# Patient Record
Sex: MALE | URBAN - METROPOLITAN AREA
[De-identification: names, ages, dates, MRNs, and addresses within clinical notes are randomized per-mention and may not be internally consistent; named-entity substitution may affect disease eponyms.]

---

## 2018-07-12 ENCOUNTER — OFFICE VISIT (OUTPATIENT)
Dept: URBAN - METROPOLITAN AREA CLINIC 58 | Facility: CLINIC | Age: 73
End: 2018-07-12
Payer: MEDICARE

## 2018-07-12 PROCEDURE — 92012 INTRM OPH EXAM EST PATIENT: CPT | Performed by: OPTOMETRIST

## 2018-07-12 ASSESSMENT — INTRAOCULAR PRESSURE
OD: 12
OS: 12

## 2018-07-12 NOTE — IMPRESSION/PLAN
Impression: Low-tension glaucoma, bilateral, moderate stage: F82.8042. Plan: Discussed diagnosis in detail with pt. Will continue to observe patient's IOP and vision.   Continue lumigan 1gtt OU qhs

## 2018-12-07 ENCOUNTER — OFFICE VISIT (OUTPATIENT)
Dept: URBAN - METROPOLITAN AREA CLINIC 58 | Facility: CLINIC | Age: 73
End: 2018-12-07
Payer: MEDICARE

## 2018-12-07 DIAGNOSIS — H40.1231 LOW-TENSION GLAUCOMA, BILATERAL, MILD STAGE: ICD-10-CM

## 2018-12-07 PROCEDURE — 92014 COMPRE OPH EXAM EST PT 1/>: CPT | Performed by: OPTOMETRIST

## 2018-12-07 PROCEDURE — 92133 CPTRZD OPH DX IMG PST SGM ON: CPT | Performed by: OPTOMETRIST

## 2018-12-07 ASSESSMENT — INTRAOCULAR PRESSURE
OS: 15
OD: 15

## 2018-12-07 NOTE — IMPRESSION/PLAN
Impression: Puckering of macula, right eye: H35.371. Plan: Discussed diagnosis in detail with patient. No treatment is required at this time. Will continue to observe condition and or symptoms. Call if [address] worsens.

## 2018-12-07 NOTE — IMPRESSION/PLAN
Impression: Low-tension glaucoma, bilateral, moderate stage: X07.9767. Plan: Discussed diagnosis in detail with pt. Will continue to observe patient's IOP and vision.   Continue lumigan 1gtt OU qhs

## 2019-06-12 ENCOUNTER — OFFICE VISIT (OUTPATIENT)
Dept: URBAN - METROPOLITAN AREA CLINIC 58 | Facility: CLINIC | Age: 74
End: 2019-06-12
Payer: MEDICARE

## 2019-06-12 PROCEDURE — 92014 COMPRE OPH EXAM EST PT 1/>: CPT | Performed by: OPTOMETRIST

## 2019-06-12 RX ORDER — BIMATOPROST 0.1 MG/ML
0.01 % SOLUTION/ DROPS OPHTHALMIC
Qty: 3 | Refills: 3 | Status: INACTIVE
Start: 2019-06-12 | End: 2020-06-12

## 2019-06-12 ASSESSMENT — INTRAOCULAR PRESSURE
OD: 15
OS: 15

## 2019-06-12 ASSESSMENT — KERATOMETRY
OS: 42.88
OD: 42.88

## 2019-06-12 NOTE — IMPRESSION/PLAN
Impression: Low-tension glaucoma, bilateral, moderate stage: K14.0910. Plan: Discussed diagnosis in detail with pt. Will continue to observe patient's IOP and vision.   Continue lumigan 1gtt OU qhs, good control

## 2019-12-12 ENCOUNTER — OFFICE VISIT (OUTPATIENT)
Dept: URBAN - METROPOLITAN AREA CLINIC 58 | Facility: CLINIC | Age: 74
End: 2019-12-12
Payer: MEDICARE

## 2019-12-12 DIAGNOSIS — H40.1232 LOW-TENSION GLAUCOMA, BILATERAL, MODERATE STAGE: Primary | ICD-10-CM

## 2019-12-12 PROCEDURE — 92012 INTRM OPH EXAM EST PATIENT: CPT | Performed by: OPTOMETRIST

## 2019-12-12 PROCEDURE — 92083 EXTENDED VISUAL FIELD XM: CPT | Performed by: OPTOMETRIST

## 2019-12-12 ASSESSMENT — INTRAOCULAR PRESSURE
OS: 14
OD: 13

## 2019-12-12 ASSESSMENT — KERATOMETRY
OD: 43.25
OS: 43.25

## 2019-12-12 NOTE — IMPRESSION/PLAN
Impression: Low-tension glaucoma, bilateral, moderate stage: C79.5150. Plan: Discussed diagnosis in detail with pt. Will continue to observe patient's IOP and vision. Continue lumigan 1gtt OU qhs. IOP good. VF 24-2 done today.

## 2020-06-12 ENCOUNTER — OFFICE VISIT (OUTPATIENT)
Dept: URBAN - METROPOLITAN AREA CLINIC 58 | Facility: CLINIC | Age: 75
End: 2020-06-12
Payer: MEDICARE

## 2020-06-12 DIAGNOSIS — H04.123 DRY EYE SYNDROME OF BILATERAL LACRIMAL GLANDS: ICD-10-CM

## 2020-06-12 PROCEDURE — 92133 CPTRZD OPH DX IMG PST SGM ON: CPT | Performed by: OPTOMETRIST

## 2020-06-12 PROCEDURE — 92014 COMPRE OPH EXAM EST PT 1/>: CPT | Performed by: OPTOMETRIST

## 2020-06-12 RX ORDER — BIMATOPROST 0.1 MG/ML
0.01 % SOLUTION/ DROPS OPHTHALMIC
Qty: 3 | Refills: 3 | Status: INACTIVE
Start: 2020-06-12 | End: 2021-08-10

## 2020-06-12 ASSESSMENT — INTRAOCULAR PRESSURE
OD: 15
OS: 18
OS: 17

## 2020-06-12 NOTE — IMPRESSION/PLAN
Impression: Puckering of macula, right eye: H35.371. Plan: Discussed diagnosis in detail with patient. No treatment is required at this time. Will continue to observe condition and or symptoms. Call if [address] worsens.

## 2020-06-12 NOTE — IMPRESSION/PLAN
Impression: Low-tension glaucoma, bilateral, moderate stage: K20.4534. Plan: Discussed diagnosis in detail with pt. Will continue to observe patient's IOP and vision. Continue lumigan 1gtt OU qhs. IOP good. OCT ON done today shows, OS inferior and superior notch but stable and OD, inferior and superior thinning but stable.

## 2020-12-11 ENCOUNTER — OFFICE VISIT (OUTPATIENT)
Dept: URBAN - METROPOLITAN AREA CLINIC 58 | Facility: CLINIC | Age: 75
End: 2020-12-11
Payer: MEDICARE

## 2020-12-11 PROCEDURE — 92012 INTRM OPH EXAM EST PATIENT: CPT | Performed by: OPTOMETRIST

## 2020-12-11 PROCEDURE — 92083 EXTENDED VISUAL FIELD XM: CPT | Performed by: OPTOMETRIST

## 2020-12-11 ASSESSMENT — INTRAOCULAR PRESSURE
OD: 13
OS: 14
OD: 12
OS: 12

## 2020-12-11 NOTE — IMPRESSION/PLAN
Impression: Low-tension glaucoma, bilateral, moderate stage: B51.3016. Plan: Discussed diagnosis in detail with pt. Will continue to observe patient's IOP and vision. Continue lumigan 1gtt OU qhs. IOP good. VF done today shows scattered defects OD and moderate, severe SAS OS.

## 2021-07-21 ENCOUNTER — OFFICE VISIT (OUTPATIENT)
Dept: URBAN - METROPOLITAN AREA CLINIC 58 | Facility: CLINIC | Age: 76
End: 2021-07-21
Payer: MEDICARE

## 2021-07-21 DIAGNOSIS — H35.371 PUCKERING OF MACULA, RIGHT EYE: ICD-10-CM

## 2021-07-21 PROCEDURE — 76514 ECHO EXAM OF EYE THICKNESS: CPT | Performed by: OPTOMETRIST

## 2021-07-21 PROCEDURE — 92014 COMPRE OPH EXAM EST PT 1/>: CPT | Performed by: OPTOMETRIST

## 2021-07-21 PROCEDURE — 92133 CPTRZD OPH DX IMG PST SGM ON: CPT | Performed by: OPTOMETRIST

## 2021-07-21 ASSESSMENT — INTRAOCULAR PRESSURE
OS: 16
OD: 17

## 2021-07-21 NOTE — IMPRESSION/PLAN
Impression: Puckering of macula, right eye: H35.371. Plan: Discussed diagnosis in detail with patient. Advised patient of condition. Monitor. Order testing next visit.

## 2021-07-21 NOTE — IMPRESSION/PLAN
Impression: Low-tension glaucoma, bilateral, moderate stage: P06.4255. Plan: Discussed diagnosis in detail with pt. Will continue to observe patient's IOP and vision. Continue lumigan 1gtt OU qhs. IOP good. OCT ON performed today, shows stable IAS OS.

## 2022-01-21 ENCOUNTER — OFFICE VISIT (OUTPATIENT)
Dept: URBAN - METROPOLITAN AREA CLINIC 58 | Facility: CLINIC | Age: 77
End: 2022-01-21
Payer: MEDICARE

## 2022-01-21 DIAGNOSIS — H49.10 4TH NERVE PALSY OF EYE: ICD-10-CM

## 2022-01-21 PROCEDURE — 92083 EXTENDED VISUAL FIELD XM: CPT | Performed by: OPTOMETRIST

## 2022-01-21 PROCEDURE — 92134 CPTRZ OPH DX IMG PST SGM RTA: CPT | Performed by: OPTOMETRIST

## 2022-01-21 PROCEDURE — 99213 OFFICE O/P EST LOW 20 MIN: CPT | Performed by: OPTOMETRIST

## 2022-01-21 ASSESSMENT — INTRAOCULAR PRESSURE
OD: 18
OS: 16

## 2022-01-21 NOTE — IMPRESSION/PLAN
Impression: Low-tension glaucoma, bilateral, moderate stage: R41.4214. Plan: Discussed diagnosis in detail with pt. Will continue to observe patient's IOP and vision. Continue lumigan 1gtt OU qhs. VF 24-2 ordered and performed today.

## 2022-01-21 NOTE — IMPRESSION/PLAN
Impression: Puckering of macula, right eye: H35.371. Plan: Discussed diagnosis in detail with patient. Advised patient of condition. OCT MAC done today shows ERM OD and WNL OS. Monitor.

## 2022-01-21 NOTE — IMPRESSION/PLAN
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 Plan: Discussed diagnosis in detail with patient. 4th nerve palsy OD. Most likely microvascular in nature. Will go away on it's own. Will continue to monitor. Call if worsening.

## 2022-02-23 ENCOUNTER — OFFICE VISIT (OUTPATIENT)
Dept: URBAN - METROPOLITAN AREA CLINIC 58 | Facility: CLINIC | Age: 77
End: 2022-02-23
Payer: MEDICARE

## 2022-02-23 DIAGNOSIS — H26.492 OTHER SECONDARY CATARACT, LEFT EYE: ICD-10-CM

## 2022-02-23 PROCEDURE — 99213 OFFICE O/P EST LOW 20 MIN: CPT | Performed by: OPTOMETRIST

## 2022-02-23 ASSESSMENT — INTRAOCULAR PRESSURE
OD: 16
OS: 16

## 2022-02-23 NOTE — IMPRESSION/PLAN
Impression: Low-tension glaucoma, bilateral, moderate stage: Q54.9342. Plan: Discussed diagnosis in detail with pt. Will continue to observe patient's IOP and vision. Continue Lumigan 1gtt OU QHS.

## 2022-02-23 NOTE — IMPRESSION/PLAN
Impression: Other secondary cataract, left eye: H26.492. Plan: S/P YAG Capsulotomy. Opening is small and may be causing ghosting of imagines and glare. No treatment required. Call if [facility] worse.

## 2022-02-23 NOTE — IMPRESSION/PLAN
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 Plan: 4th nerve palsy OD, improving. Observation recommended. Call if symptoms worsening.

## 2024-01-17 ENCOUNTER — OFFICE VISIT (OUTPATIENT)
Dept: URBAN - METROPOLITAN AREA CLINIC 58 | Facility: CLINIC | Age: 79
End: 2024-01-17
Payer: MEDICARE

## 2024-01-17 DIAGNOSIS — H26.492 OTHER SECONDARY CATARACT, LEFT EYE: ICD-10-CM

## 2024-01-17 DIAGNOSIS — H35.371 PUCKERING OF MACULA, RIGHT EYE: Primary | ICD-10-CM

## 2024-01-17 DIAGNOSIS — H40.1232 LOW-TENSION GLAUCOMA, BILATERAL, MODERATE STAGE: ICD-10-CM

## 2024-01-17 DIAGNOSIS — H49.10 4TH NERVE PALSY OF EYE: ICD-10-CM

## 2024-01-17 PROCEDURE — 92014 COMPRE OPH EXAM EST PT 1/>: CPT | Performed by: OPTOMETRIST

## 2024-01-17 PROCEDURE — 92134 CPTRZ OPH DX IMG PST SGM RTA: CPT | Performed by: OPTOMETRIST

## 2024-01-17 RX ORDER — BIMATOPROST 0.1 MG/ML
0.01 % SOLUTION/ DROPS OPHTHALMIC
Qty: 7.5 | Refills: 3 | Status: ACTIVE
Start: 2024-01-17

## 2024-01-17 ASSESSMENT — KERATOMETRY
OD: 43.00
OS: 42.88

## 2024-01-17 ASSESSMENT — INTRAOCULAR PRESSURE
OS: 16
OD: 16

## 2024-09-11 ENCOUNTER — OFFICE VISIT (OUTPATIENT)
Dept: URBAN - METROPOLITAN AREA CLINIC 58 | Facility: CLINIC | Age: 79
End: 2024-09-11
Payer: MEDICARE

## 2024-09-11 DIAGNOSIS — H40.1232 LOW-TENSION GLAUCOMA, BILATERAL, MODERATE STAGE: Primary | ICD-10-CM

## 2024-09-11 DIAGNOSIS — H35.371 PUCKERING OF MACULA, RIGHT EYE: ICD-10-CM

## 2024-09-11 PROCEDURE — 92014 COMPRE OPH EXAM EST PT 1/>: CPT | Performed by: OPTOMETRIST

## 2024-09-11 PROCEDURE — 92133 CPTRZD OPH DX IMG PST SGM ON: CPT | Performed by: OPTOMETRIST

## 2024-09-11 ASSESSMENT — INTRAOCULAR PRESSURE
OD: 18
OS: 16

## 2025-04-10 ENCOUNTER — OFFICE VISIT (OUTPATIENT)
Dept: URBAN - METROPOLITAN AREA CLINIC 58 | Facility: CLINIC | Age: 80
End: 2025-04-10
Payer: COMMERCIAL

## 2025-04-10 DIAGNOSIS — H35.371 PUCKERING OF MACULA, RIGHT EYE: ICD-10-CM

## 2025-04-10 DIAGNOSIS — H40.1232 LOW-TENSION GLAUCOMA, BILATERAL, MODERATE STAGE: Primary | ICD-10-CM

## 2025-04-10 PROCEDURE — 99214 OFFICE O/P EST MOD 30 MIN: CPT | Performed by: OPTOMETRIST

## 2025-04-10 PROCEDURE — 92134 CPTRZ OPH DX IMG PST SGM RTA: CPT | Performed by: OPTOMETRIST

## 2025-04-10 PROCEDURE — 92083 EXTENDED VISUAL FIELD XM: CPT | Performed by: OPTOMETRIST

## 2025-04-10 ASSESSMENT — INTRAOCULAR PRESSURE
OS: 16
OD: 17